# Patient Record
Sex: FEMALE | Race: WHITE | NOT HISPANIC OR LATINO
[De-identification: names, ages, dates, MRNs, and addresses within clinical notes are randomized per-mention and may not be internally consistent; named-entity substitution may affect disease eponyms.]

---

## 2018-05-07 ENCOUNTER — APPOINTMENT (OUTPATIENT)
Dept: OBGYN | Facility: CLINIC | Age: 68
End: 2018-05-07
Payer: MEDICARE

## 2018-05-07 VITALS
HEIGHT: 63 IN | BODY MASS INDEX: 30.12 KG/M2 | WEIGHT: 170 LBS | DIASTOLIC BLOOD PRESSURE: 72 MMHG | SYSTOLIC BLOOD PRESSURE: 140 MMHG

## 2018-05-07 DIAGNOSIS — Z01.419 ENCOUNTER FOR GYNECOLOGICAL EXAMINATION (GENERAL) (ROUTINE) W/OUT ABNORMAL FINDINGS: ICD-10-CM

## 2018-05-07 DIAGNOSIS — R10.30 LOWER ABDOMINAL PAIN, UNSPECIFIED: ICD-10-CM

## 2018-05-07 PROCEDURE — 99212 OFFICE O/P EST SF 10 MIN: CPT | Mod: 25

## 2018-05-07 PROCEDURE — G0101: CPT

## 2018-05-14 LAB — CYTOLOGY CVX/VAG DOC THIN PREP: NORMAL

## 2021-08-09 ENCOUNTER — APPOINTMENT (OUTPATIENT)
Dept: OBGYN | Facility: CLINIC | Age: 71
End: 2021-08-09
Payer: MEDICARE

## 2021-08-09 VITALS
BODY MASS INDEX: 30.65 KG/M2 | WEIGHT: 173 LBS | SYSTOLIC BLOOD PRESSURE: 138 MMHG | DIASTOLIC BLOOD PRESSURE: 82 MMHG | HEIGHT: 63 IN

## 2021-08-09 DIAGNOSIS — Z01.419 ENCOUNTER FOR GYNECOLOGICAL EXAMINATION (GENERAL) (ROUTINE) W/OUT ABNORMAL FINDINGS: ICD-10-CM

## 2021-08-09 PROCEDURE — G0101: CPT

## 2021-08-09 RX ORDER — LOSARTAN POTASSIUM 50 MG/1
50 TABLET, FILM COATED ORAL
Qty: 90 | Refills: 0 | Status: ACTIVE | COMMUNITY
Start: 2021-08-02

## 2021-08-09 NOTE — HISTORY OF PRESENT ILLNESS
[FreeTextEntry1] : patient presents today for routine annual exam.\par  [postmenopausal] : postmenopausal [TextBox_4] : no complaints  [Mammogramdate] : 8/2021 [PapSmeardate] : 5/2018

## 2021-08-12 ENCOUNTER — NON-APPOINTMENT (OUTPATIENT)
Age: 71
End: 2021-08-12

## 2021-08-16 LAB — CYTOLOGY CVX/VAG DOC THIN PREP: ABNORMAL

## 2021-08-21 ENCOUNTER — TRANSCRIPTION ENCOUNTER (OUTPATIENT)
Age: 71
End: 2021-08-21

## 2021-08-23 ENCOUNTER — TRANSCRIPTION ENCOUNTER (OUTPATIENT)
Age: 71
End: 2021-08-23

## 2021-10-27 ENCOUNTER — APPOINTMENT (OUTPATIENT)
Dept: ORTHOPEDIC SURGERY | Facility: CLINIC | Age: 71
End: 2021-10-27

## 2021-11-04 ENCOUNTER — APPOINTMENT (OUTPATIENT)
Dept: ORTHOPEDIC SURGERY | Facility: CLINIC | Age: 71
End: 2021-11-04
Payer: MEDICARE

## 2021-11-04 VITALS — HEIGHT: 64 IN | BODY MASS INDEX: 32.44 KG/M2 | WEIGHT: 190 LBS

## 2021-11-04 DIAGNOSIS — M65.341 TRIGGER FINGER, RIGHT RING FINGER: ICD-10-CM

## 2021-11-04 DIAGNOSIS — M79.641 PAIN IN RIGHT HAND: ICD-10-CM

## 2021-11-04 DIAGNOSIS — M79.642 PAIN IN RIGHT HAND: ICD-10-CM

## 2021-11-04 PROCEDURE — 99203 OFFICE O/P NEW LOW 30 MIN: CPT | Mod: 25

## 2021-11-04 PROCEDURE — 73130 X-RAY EXAM OF HAND: CPT | Mod: 50

## 2021-11-04 PROCEDURE — 20550 NJX 1 TENDON SHEATH/LIGAMENT: CPT | Mod: F8

## 2021-11-04 RX ORDER — MELOXICAM 100 %
POWDER (GRAM) MISCELLANEOUS
Refills: 0 | Status: ACTIVE | COMMUNITY

## 2021-11-04 NOTE — DISCUSSION/SUMMARY
[FreeTextEntry1] : The underlying pathophysiology was reviewed with the patient. XR films were reviewed with the patient. Discussed at length the nature of the patient’s condition. The right and left hand symptoms appear secondary to basal joint arthritis. The right ring finger symptoms appear secondary to trigger finger.\par \par With regard to the bilateral basal joint arthritis, treatment options were discussed such as cortisone injections. She has deferred a cortisone injection at this time in lieu of more conservative management such as soaking the hand in warm water and Epsom salts as well as use of Tylenol and NSAIDs.\par Rx: Meloxicam 15mg\par \par With regard to the right ring finger, I am recommending a cortisone injection into the flexor tendon sheath due to the severity of her symptoms.\par The patient wishes to proceed with a cortisone injection at this time (1/3). The skin was prepped with alcohol and sprayed with Ethyl Chloride. An injection of 0.5 cc 1% Lidocaine without epinephrine, 0.25 cc Kenalog 40mg, and 0.25 cc Dexamethasone was administered into the flexor tendon sheath of the right ring finger. The patient tolerated the procedure well. Apply ice. \par \par All questions answered, understanding verbalized. Patient in agreement with plan of care. Follow up as needed according to her symptoms.

## 2021-11-04 NOTE — ADDENDUM
[FreeTextEntry1] : I, Lety Gamino wrote this note acting as a scribe for Dr. Seamus Lira on Nov 04, 2021.

## 2021-11-04 NOTE — PHYSICAL EXAM
[de-identified] : Patient is WDWN, alert, and in no acute distress. Breathing is unlabored. She is grossly oriented to person, place, and time.\par \par Right Hand: \par There is A1 pulley tenderness in the right ring finger. Full arc of motion in the fingers, and all intrinsic and extrinsic hand muscles 5/5. No joint instability on provocative testing, sensation is intact to light touch, and no skin lesions or discoloration. \par There is basal joint tenderness. Full arc of motion in the fingers with pain on thumb ROM. All intrinsic and extrinsic hand muscles 5/5. No joint instability on provocative testing. Sensation is intact to light touch. There are no skin lesions or discoloration.	\par \par Left Hand: \par There is basal joint tenderness. Full arc of motion in the fingers with pain on thumb ROM. All intrinsic and extrinsic hand muscles 5/5. No joint instability on provocative testing. Sensation is intact to light touch. There are no skin lesions or discoloration.	   [de-identified] : AP, lateral and oblique views of the right and left hands were obtained today and revealed basal joint arthritis bilaterally as well as throughout the DIP joints.

## 2021-11-04 NOTE — HISTORY OF PRESENT ILLNESS
[Right] : right hand dominant [FreeTextEntry1] : Pt is a 70 y/o female with bilateral hand pain for years.  The pain has gotten progressively worse.  She states that she has been told that she has arthritis.  She was given Meloxicam which helps but her PCP does not want her to continue taking it every day.  She also c/o pain and triggering in the right ring finger.  She is unable to completely extend the finger.  This finger is the most painful one on either hand.  She burned it in the kitchen recently because she has difficulty using it.  She cares for her intellectually disabled son and it is difficult because of the pain in her hands.

## 2021-11-04 NOTE — END OF VISIT
[FreeTextEntry3] : All medical record entries made by the Scribe were at my,  Dr. Seamus Lira MD., direction and personally dictated by me on 11/04/2021. I have personally reviewed the chart and agree that the record accurately reflects my personal performance of the history, physical exam, assessment and plan.

## 2022-04-18 ENCOUNTER — RX RENEWAL (OUTPATIENT)
Age: 72
End: 2022-04-18

## 2022-04-18 RX ORDER — MELOXICAM 15 MG/1
15 TABLET ORAL
Qty: 30 | Refills: 3 | Status: ACTIVE | COMMUNITY
Start: 2021-11-09 | End: 1900-01-01

## 2022-05-05 ENCOUNTER — APPOINTMENT (OUTPATIENT)
Dept: ORTHOPEDIC SURGERY | Facility: CLINIC | Age: 72
End: 2022-05-05
Payer: MEDICARE

## 2022-05-05 DIAGNOSIS — M19.049 PRIMARY OSTEOARTHRITIS, UNSPECIFIED HAND: ICD-10-CM

## 2022-05-05 PROCEDURE — 20600 DRAIN/INJ JOINT/BURSA W/O US: CPT

## 2022-05-05 PROCEDURE — 99213 OFFICE O/P EST LOW 20 MIN: CPT | Mod: 25

## 2022-05-06 PROBLEM — M19.049 CMC ARTHRITIS: Status: ACTIVE | Noted: 2022-05-06

## 2022-05-06 NOTE — DISCUSSION/SUMMARY
[FreeTextEntry1] : The underlying pathophysiology was reviewed with the patient. XR films were reviewed with the patient. Discussed at length the nature of the patient’s condition. The right and left hand symptoms appear secondary to basal joint arthritis. The right ring finger symptoms are secondary to trigger finger. \par \par With regard to the left thumb, I have recommended a cortisone injection to the CMC joint.\par The patient wishes to proceed with a cortisone injection at this time. Under sterile precautions, an injection of 0.5 cc 1% lidocaine with 0.25 cc of Kenalog and 0.25 cc of Dexamethasone was administered into the LEFT basal joint. The patient tolerated the procedure well. Rest and apply ice.  \par \par With regard to the right ring finger, I have recommended observation.\par \par All questions answered, understanding verbalized. Patient in agreement with plan of care. Follow up as needed.

## 2022-05-06 NOTE — END OF VISIT
Patient ID: Mirna Perry is a 78 y o  female  HPI: 78 y  o female presents for follow up of hypertension, hypercholesterolemia, insomnia ; all well controlled  She denies any dizziness, palpitaitons, dyspnea on exertion, but complains of allergy symptoms- runny nose pnd, sinus pressure despite daily use of flonase , and claritin  SUBJECTIVE    Family History   Problem Relation Age of Onset    Hypertension Mother         Essential    Cancer Maternal Aunt     Breast cancer Maternal Aunt     No Known Problems Father     No Known Problems Maternal Grandmother     No Known Problems Maternal Grandfather     No Known Problems Paternal Grandmother     No Known Problems Paternal Grandfather      Social History     Socioeconomic History    Marital status: /Civil Union     Spouse name: Not on file    Number of children: Not on file    Years of education: Not on file    Highest education level: Not on file   Occupational History    Not on file   Social Needs    Financial resource strain: Not on file    Food insecurity     Worry: Not on file     Inability: Not on file   [employer] needs     Medical: Not on file     Non-medical: Not on file   Tobacco Use    Smoking status: Never Smoker    Smokeless tobacco: Never Used   Substance and Sexual Activity    Alcohol use: Yes     Comment: occ       Drug use: No    Sexual activity: Not on file   Lifestyle    Physical activity     Days per week: Not on file     Minutes per session: Not on file    Stress: Not on file   Relationships    Social connections     Talks on phone: Not on file     Gets together: Not on file     Attends Faith service: Not on file     Active member of club or organization: Not on file     Attends meetings of clubs or organizations: Not on file     Relationship status: Not on file    Intimate partner violence     Fear of current or ex partner: Not on file     Emotionally abused: Not on file     Physically abused: Not on file     Forced sexual activity: Not on file   Other Topics Concern    Not on file   Social History Narrative    Daily coffee consumption (___ cups /day)    Daily cola consumption (___ cups/day)    Daily tea consumptn  (___ cups/day)    Personal Protective equipment Seatbelts     Past Medical History:   Diagnosis Date    Hyperlipidemia     Hypertension      No past surgical history on file    No Known Allergies    Current Outpatient Medications:     Ascorbic Acid (VITAMIN C) 500 MG CAPS, Take 1 capsule by mouth daily, Disp: , Rfl:     calcium carbonate (OS-REA) 600 MG tablet, Take 600 mg by mouth daily, Disp: , Rfl:     clonazePAM (KlonoPIN) 0 5 mg tablet, Take 1 tablet by mouth 3 (three) times a day as needed, Disp: , Rfl:     cyanocobalamin (VITAMIN B-12) 500 mcg tablet, Take 1 tablet by mouth daily, Disp: , Rfl:     fluticasone (FLONASE) 50 mcg/act nasal spray, USE 2 SPRAYS IN EACH  NOSTRIL DAILY, Disp: 48 g, Rfl: 2    hyoscyamine (LEVSIN/SL) 0 125 mg SL tablet, Take 1 tablet (0 125 mg total) by mouth every 4 (four) hours as needed for cramping, Disp: 40 tablet, Rfl: 1    ibandronate (BONIVA) 150 MG tablet, Take 1 tablet by mouth every 30 days, Disp: 3 tablet, Rfl: 3    latanoprost (XALATAN) 0 005 % ophthalmic solution, 1 drop daily at bedtime, Disp: , Rfl:     meloxicam (MOBIC) 15 mg tablet, Take 1 tablet (15 mg total) by mouth daily, Disp: 90 tablet, Rfl: 3    metoprolol succinate (TOPROL-XL) 50 mg 24 hr tablet, TAKE 2 TABLETS BY MOUTH IN  THE MORNING AND 1 TABLET IN THE EVENING, Disp: 270 tablet, Rfl: 3    Multiple Vitamin (MULTI-VITAMIN DAILY) TABS, Take 1 tablet by mouth daily, Disp: , Rfl:     Omega-3 Fatty Acids (FISH OIL) 1,000 mg, Take 1 capsule by mouth daily, Disp: , Rfl:     pravastatin (PRAVACHOL) 20 mg tablet, TAKE 1 TABLET BY MOUTH  DAILY, Disp: 90 tablet, Rfl: 2    psyllium (METAMUCIL) 0 52 g capsule, Take 2 capsules by mouth daily, Disp: , Rfl:     vitamin E, tocopherol, 400 units capsule, Take 1 capsule by mouth daily, Disp: , Rfl:     zolpidem (AMBIEN CR) 12 5 MG CR tablet, TAKE 1 TABLET BY MOUTH  DAILY AT BEDTIME, Disp: 90 tablet, Rfl: 3    montelukast (SINGULAIR) 10 mg tablet, Take 1 tablet (10 mg total) by mouth daily at bedtime, Disp: 30 tablet, Rfl: 5    Review of Systems  Constitutional:     Denies fever, chills ,fatigue ,weakness ,weight loss, weight gain     ENT: Denies earache ,loss of hearing ,nosebleed, nasal discharge,+nasal congestion ,no sore throat ,nohoarseness  + rhinorrhea, + ear pressure   Pulmonary: Denies shortness of breath ,cough  ,dyspnea on exertion, orthopnea  ,+PND   Cardiovascular:  Denies bradycardia , tachycardia  ,palpations, lower extremity edema leg, claudication  Breast:  Denies new or changing breast lumps ,nipple discharge ,nipple changes  Abdomen:  Denies abdominal pain , anorexia , indigestion, nausea, vomiting, constipation, diarrhea  Musculoskeletal: Denies myalgias, arthralgias, joint swelling, joint stiffness , limb pain, limb swelling  Gu: denies dysuria, polyuria  Skin: Denies skin rash, skin lesion, skin wound, itching, dry skin  Neuro: Denies headache, numbness, tingling, confusion, loss of consciousness, dizziness, vertigo  Psychiatric: Denies feelings of depression, suicidal ideation, anxiety, sleep disturbances    OBJECTIVE  /82   Pulse 74   Temp 98 4 °F (36 9 °C)   Ht 5' 6" (1 676 m)   Wt 81 2 kg (179 lb)   BMI 28 89 kg/m²   Constitutional:   NAD, well appearing and well nourished      ENT:   Conjunctiva and lids: no injection, edema, or discharge     Pupils and iris: RENETTA bilaterally    External inspection of ears and nose: normal without deformities or discharge  Otoscopic exam: Canals patent without erythema  Nasal mucosa, septum and turbinates: + turb injection         Oropharynx:  Moist mucosa, normal tongue and tonsils without lesions  + erythema+ injection         Pulmonary:Respiratory effort normal [FreeTextEntry3] : All medical record entries made by the Scribe were at my,  Dr. Seamus Lira MD., direction and personally dictated by me on 05/05/2022. I have personally reviewed the chart and agree that the record accurately reflects my personal performance of the history, physical exam, assessment and plan.  rate and rhythm, no increased work of breathing  Auscultation of lungs:  Clear bilaterally with no adventitious breath sounds       Cardiovascular: regular rate and rhythm, S1 and S2, no murmur, no edema and/or varicosities of LE    Abdomen: Soft and non-distended     Positive bowel sounds      No heptomegaly or splenomegaly      Gu: no suprapubic tenderness or CVA tenderness, no urethral discharge  Lymphatic:  No anterior or posterior cervical lymphadenopathy         Musculoskeletal:  Gait and station: Normal gait      Digits and nails normal without clubbing or cyanosis       Inspection/palpation of joints, bones, and muscles:  No joint tenderness, swelling, full active and passive range of motion       Skin: Normal skin turgor and no rashes      Neuro:    Normal reflexes      Psych:   alert and oriented to person, place and time     normal mood and affect       Assessment/Plan:Diagnoses and all orders for this visit:    Allergic rhinitis, unspecified seasonality, unspecified trigger  -     montelukast (SINGULAIR) 10 mg tablet; Take 1 tablet (10 mg total) by mouth daily at bedtime    Essential hypertension  Comments:  Stable on beta-blocker therapy  Hypercholesterolemia  Comments:  Continue with statin therapy  Insomnia, unspecified type  Comments:  Continue zolpidem therapy  Breast cancer screening by mammogram  -     Mammo screening bilateral w 3d & cad; Future        I will see patient back in 6 mos or sooner prn

## 2022-05-06 NOTE — ADDENDUM
[FreeTextEntry1] : I, Lety Gamino wrote this note acting as a scribe for Dr. Seamus Lira on May 05, 2022.

## 2022-05-06 NOTE — HISTORY OF PRESENT ILLNESS
[Right] : right hand dominant [FreeTextEntry1] : Patient is a 72 year old female who presents with complaints of bilateral hand pain. She was last seen in office on 11/4/21 at which time she was given a cortisone injection to the right ring finger. She reports to have gotten long term relief. The symptoms have began to return but not as severe as they were. She is more concerned about pain to the left thumb, CMC joint today. She is taking Meloxicam for pain for other issues and states it does help with her left thumb.

## 2022-05-06 NOTE — PHYSICAL EXAM
[de-identified] : Patient is WDWN, alert, and in no acute distress. Breathing is unlabored. She is grossly oriented to person, place, and time.\par \par Left Hand: \par There is basal joint tenderness. Full arc of motion in the fingers with pain on thumb ROM. All intrinsic and extrinsic hand muscles 5/5. No joint instability on provocative testing. Sensation is intact to light touch. There are no skin lesions or discoloration. \par \par Right Hand: \par There is A1 pulley tenderness in the right ring finger. Full arc of motion in the fingers, and all intrinsic and extrinsic hand muscles 5/5. No joint instability on provocative testing, sensation is intact to light touch, and no skin lesions or discoloration. \par There is basal joint tenderness. Full arc of motion in the fingers with pain on thumb ROM. All intrinsic and extrinsic hand muscles 5/5. No joint instability on provocative testing. Sensation is intact to light touch. There are no skin lesions or discoloration.	 [de-identified] : no imaging today

## 2022-08-25 ENCOUNTER — RX RENEWAL (OUTPATIENT)
Age: 72
End: 2022-08-25

## 2022-09-22 ENCOUNTER — RX RENEWAL (OUTPATIENT)
Age: 72
End: 2022-09-22

## 2022-10-24 ENCOUNTER — RX RENEWAL (OUTPATIENT)
Age: 72
End: 2022-10-24

## 2022-11-28 ENCOUNTER — RX RENEWAL (OUTPATIENT)
Age: 72
End: 2022-11-28

## 2022-12-05 ENCOUNTER — RX RENEWAL (OUTPATIENT)
Age: 72
End: 2022-12-05

## 2023-02-22 ENCOUNTER — RX RENEWAL (OUTPATIENT)
Age: 73
End: 2023-02-22

## 2023-02-27 ENCOUNTER — APPOINTMENT (OUTPATIENT)
Dept: VASCULAR SURGERY | Facility: CLINIC | Age: 73
End: 2023-02-27

## 2023-03-23 ENCOUNTER — RX RENEWAL (OUTPATIENT)
Age: 73
End: 2023-03-23

## 2023-07-26 ENCOUNTER — RX RENEWAL (OUTPATIENT)
Age: 73
End: 2023-07-26

## 2023-08-24 ENCOUNTER — RX RENEWAL (OUTPATIENT)
Age: 73
End: 2023-08-24

## 2023-10-02 ENCOUNTER — RX RENEWAL (OUTPATIENT)
Age: 73
End: 2023-10-02

## 2023-10-05 ENCOUNTER — RX RENEWAL (OUTPATIENT)
Age: 73
End: 2023-10-05

## 2023-11-30 ENCOUNTER — RX RENEWAL (OUTPATIENT)
Age: 73
End: 2023-11-30

## 2023-11-30 RX ORDER — MELOXICAM 15 MG/1
15 TABLET ORAL
Qty: 30 | Refills: 3 | Status: ACTIVE | COMMUNITY
Start: 2022-05-05 | End: 1900-01-01

## 2024-12-06 ENCOUNTER — APPOINTMENT (OUTPATIENT)
Dept: OBGYN | Facility: CLINIC | Age: 74
End: 2024-12-06

## 2024-12-06 ENCOUNTER — NON-APPOINTMENT (OUTPATIENT)
Age: 74
End: 2024-12-06

## 2024-12-06 VITALS
SYSTOLIC BLOOD PRESSURE: 140 MMHG | WEIGHT: 160 LBS | HEIGHT: 64 IN | BODY MASS INDEX: 27.31 KG/M2 | DIASTOLIC BLOOD PRESSURE: 72 MMHG

## 2024-12-06 DIAGNOSIS — Z01.419 ENCOUNTER FOR GYNECOLOGICAL EXAMINATION (GENERAL) (ROUTINE) W/OUT ABNORMAL FINDINGS: ICD-10-CM

## 2024-12-06 PROCEDURE — G0444 DEPRESSION SCREEN ANNUAL: CPT | Mod: 59

## 2024-12-06 PROCEDURE — 99459 PELVIC EXAMINATION: CPT

## 2024-12-06 PROCEDURE — 99387 INIT PM E/M NEW PAT 65+ YRS: CPT
